# Patient Record
Sex: MALE | Race: WHITE | HISPANIC OR LATINO | Employment: OTHER | URBAN - METROPOLITAN AREA
[De-identification: names, ages, dates, MRNs, and addresses within clinical notes are randomized per-mention and may not be internally consistent; named-entity substitution may affect disease eponyms.]

---

## 2017-05-31 ENCOUNTER — HOSPITAL ENCOUNTER (EMERGENCY)
Facility: MEDICAL CENTER | Age: 32
End: 2017-05-31
Attending: EMERGENCY MEDICINE

## 2017-05-31 VITALS
OXYGEN SATURATION: 98 % | TEMPERATURE: 97.6 F | RESPIRATION RATE: 16 BRPM | HEART RATE: 74 BPM | SYSTOLIC BLOOD PRESSURE: 110 MMHG | WEIGHT: 158.51 LBS | DIASTOLIC BLOOD PRESSURE: 74 MMHG | HEIGHT: 70 IN | BODY MASS INDEX: 22.69 KG/M2

## 2017-05-31 DIAGNOSIS — L02.91 ABSCESS: ICD-10-CM

## 2017-05-31 PROCEDURE — 302875 HCHG BANDAGE ACE 4 OR 6""

## 2017-05-31 PROCEDURE — 303977 HCHG I & D

## 2017-05-31 PROCEDURE — 700101 HCHG RX REV CODE 250

## 2017-05-31 PROCEDURE — 700102 HCHG RX REV CODE 250 W/ 637 OVERRIDE(OP): Performed by: EMERGENCY MEDICINE

## 2017-05-31 PROCEDURE — A9270 NON-COVERED ITEM OR SERVICE: HCPCS | Performed by: EMERGENCY MEDICINE

## 2017-05-31 PROCEDURE — 99284 EMERGENCY DEPT VISIT MOD MDM: CPT

## 2017-05-31 RX ORDER — LIDOCAINE HYDROCHLORIDE AND EPINEPHRINE BITARTRATE 20; .01 MG/ML; MG/ML
INJECTION, SOLUTION SUBCUTANEOUS
Status: COMPLETED
Start: 2017-05-31 | End: 2017-05-31

## 2017-05-31 RX ORDER — CEPHALEXIN 500 MG/1
500 CAPSULE ORAL ONCE
Status: COMPLETED | OUTPATIENT
Start: 2017-05-31 | End: 2017-05-31

## 2017-05-31 RX ORDER — LIDOCAINE HYDROCHLORIDE AND EPINEPHRINE BITARTRATE 20; .01 MG/ML; MG/ML
10 INJECTION, SOLUTION SUBCUTANEOUS ONCE
Status: COMPLETED | OUTPATIENT
Start: 2017-05-31 | End: 2017-05-31

## 2017-05-31 RX ORDER — SULFAMETHOXAZOLE AND TRIMETHOPRIM 800; 160 MG/1; MG/1
1 TABLET ORAL 2 TIMES DAILY
Qty: 20 TAB | Refills: 0 | Status: SHIPPED | OUTPATIENT
Start: 2017-05-31 | End: 2017-06-10

## 2017-05-31 RX ORDER — SULFAMETHOXAZOLE AND TRIMETHOPRIM 800; 160 MG/1; MG/1
1 TABLET ORAL ONCE
Status: COMPLETED | OUTPATIENT
Start: 2017-05-31 | End: 2017-05-31

## 2017-05-31 RX ORDER — HYDROCODONE BITARTRATE AND ACETAMINOPHEN 5; 325 MG/1; MG/1
1-2 TABLET ORAL EVERY 4 HOURS PRN
Qty: 20 TAB | Refills: 0 | Status: SHIPPED | OUTPATIENT
Start: 2017-05-31

## 2017-05-31 RX ORDER — CEPHALEXIN 500 MG/1
500 CAPSULE ORAL 4 TIMES DAILY
Qty: 40 CAP | Refills: 0 | Status: SHIPPED | OUTPATIENT
Start: 2017-05-31 | End: 2017-06-10

## 2017-05-31 RX ADMIN — LIDOCAINE HYDROCHLORIDE AND EPINEPHRINE 10 ML: 20; 10 INJECTION, SOLUTION INFILTRATION; PERINEURAL at 10:08

## 2017-05-31 RX ADMIN — CEPHALEXIN 500 MG: 500 CAPSULE ORAL at 10:29

## 2017-05-31 RX ADMIN — SULFAMETHOXAZOLE AND TRIMETHOPRIM 1 TABLET: 800; 160 TABLET ORAL at 10:29

## 2017-05-31 RX ADMIN — LIDOCAINE HYDROCHLORIDE AND EPINEPHRINE BITARTRATE 10 ML: 20; .01 INJECTION, SOLUTION SUBCUTANEOUS at 10:08

## 2017-05-31 ASSESSMENT — PAIN SCALES - GENERAL: PAINLEVEL_OUTOF10: 8

## 2017-05-31 NOTE — ED AVS SNAPSHOT
Home Care Instructions                                                                                                                Ronnell Steele   MRN: [id number]    Department:  [facility], Emergency Dept   Date of Visit:  5/31/2017            [facility], Emergency Dept    [address] [place] [id number]    Phone:  [phone]      You were seen by     Theo Shah M.D.      Your Diagnosis Was     Abscess     L02.91       These are the medications you received during your hospitalization from 05/31/2017 0930 to 05/31/2017 1030     Date/Time Order Dose Route Action    05/31/2017 1008 lidocaine-epinephrine 2 %-1:861611 injection 10 mL 10 mL Injection Given    05/31/2017 1029 sulfamethoxazole-trimethoprim (BACTRIM DS) 800-160 MG tablet 1 Tab 1 Tab Oral Given    05/31/2017 1029 cephALEXin (KEFLEX) capsule 500 mg 500 mg Oral Given      Follow-up Information     1. Follow up with [facility], Emergency Dept.    Specialty:  Emergency Medicine    Why:  If symptoms worsen    Contact information    [address] [place] [id number] [phone]      Medication Information     Review all of your home medications and newly ordered medications with your primary doctor and/or pharmacist as soon as possible. Follow medication instructions as directed by your doctor and/or pharmacist.     Please keep your complete medication list with you and share with your physician. Update the information when medications are discontinued, doses are changed, or new medications (including over-the-counter products) are added; and carry medication information at all times in the event of emergency situations.               Medication List      START taking these medications        Instructions    Morning Afternoon Evening Bedtime    cephALEXin 500 MG Caps   Last time this was given:  500 mg on 5/31/2017 10:29 AM   Commonly known as:  KEFLEX        Take 1 Cap by  mouth 4 times a day for 10 days.   Dose:  500 mg                        hydrocodone-acetaminophen 5-325 MG Tabs per tablet   Commonly known as:  NORCO        Take 1-2 Tabs by mouth every four hours as needed.   Dose:  1-2 Tab                        sulfamethoxazole-trimethoprim 800-160 MG tablet   Last time this was given:  1 Tab on 5/31/2017 10:29 AM   Commonly known as:  BACTRIM DS        Take 1 Tab by mouth 2 times a day for 10 days.   Dose:  1 Tab                             Where to Get Your Medications      You can get these medications from any pharmacy     Bring a paper prescription for each of these medications    - cephALEXin 500 MG Caps  - hydrocodone-acetaminophen 5-325 MG Tabs per tablet  - sulfamethoxazole-trimethoprim 800-160 MG tablet              Discharge Instructions       Abscess  An abscess is an infected area that contains a collection of pus and debris. It can occur in almost any part of the body. An abscess is also known as a furuncle or boil.  CAUSES   An abscess occurs when tissue gets infected. This can occur from blockage of oil or sweat glands, infection of hair follicles, or a minor injury to the skin. As the body tries to fight the infection, pus collects in the area and creates pressure under the skin. This pressure causes pain. People with weakened immune systems have difficulty fighting infections and get certain abscesses more often.   SYMPTOMS  Usually an abscess develops on the skin and becomes a painful mass that is red, warm, and tender. If the abscess forms under the skin, you may feel a moveable soft area under the skin. Some abscesses break open (rupture) on their own, but most will continue to get worse without care. The infection can spread deeper into the body and eventually into the bloodstream, causing you to feel ill.   DIAGNOSIS   Your caregiver will take your medical history and perform a physical exam. A sample of fluid may also be taken from the abscess to  determine what is causing your infection.  TREATMENT   Your caregiver may prescribe antibiotic medicines to fight the infection. However, taking antibiotics alone usually does not cure an abscess. Your caregiver may need to make a small cut (incision) in the abscess to drain the pus. In some cases, gauze is packed into the abscess to reduce pain and to continue draining the area.  HOME CARE INSTRUCTIONS   · Only take over-the-counter or prescription medicines for pain, discomfort, or fever as directed by your caregiver.  · If you were prescribed antibiotics, take them as directed. Finish them even if you start to feel better.  · If gauze is used, follow your caregiver's directions for changing the gauze.  · To avoid spreading the infection:  ¨ Keep your draining abscess covered with a bandage.  ¨ Wash your hands well.  ¨ Do not share personal care items, towels, or whirlpools with others.  ¨ Avoid skin contact with others.  · Keep your skin and clothes clean around the abscess.  · Keep all follow-up appointments as directed by your caregiver.  SEEK MEDICAL CARE IF:   · You have increased pain, swelling, redness, fluid drainage, or bleeding.  · You have muscle aches, chills, or a general ill feeling.  · You have a fever.  MAKE SURE YOU:   · Understand these instructions.  · Will watch your condition.  · Will get help right away if you are not doing well or get worse.     This information is not intended to replace advice given to you by your health care provider. Make sure you discuss any questions you have with your health care provider.     Document Released: 09/27/2006 Document Revised: 06/18/2013 Document Reviewed: 03/01/2013  Elsevier Interactive Patient Education ©2016 [employer]            Patient Information     Patient Information    Following emergency treatment: all patient requiring follow-up care must return either to a private physician or a clinic if your condition worsens before you are able to  obtain further medical attention, please return to the emergency room.     Billing Information    At [facility], we work to make the billing process streamlined for our patients.  Our Representatives are here to answer any questions you may have regarding your hospital bill.  If you have insurance coverage and have supplied your insurance information to us, we will submit a claim to your insurer on your behalf.  Should you have any questions regarding your bill, we can be reached online or by phone as follows:  Online: You are able pay your bills online or live chat with our representatives about any billing questions you may have. We are here to help Monday - Friday from 8:00am to 7:30pm and 9:00am - 12:00pm on Saturdays.  Please visit [email or web address]  for more information.   Phone:  [phone] or [phone]    Please note that your emergency physician, surgeon, pathologist, radiologist, anesthesiologist, and other specialists are not employed by [facility] and will therefore bill separately for their services.  Please contact them directly for any questions concerning their bills at the numbers below:     Emergency Physician Services:  [phone]  [facility]:  [phone]  [facility]:  [phone]  [facility]:  [phone]    1. Your final bill may vary from the amount quoted upon discharge if all procedures are not complete at that time, or if your doctor has additional procedures of which we are not aware. You will receive an additional bill if you return to the Emergency Department at [facility] for suture removal regardless of the facility of which the sutures were placed.     2. Please arrange for settlement of this account at the emergency registration.    3. All self-pay accounts are due in full at the time of treatment.  If you are unable to meet this obligation then payment is expected within 4-5 days.      4. If you have had radiology studies (CT, X-ray, Ultrasound, MRI), you have received a preliminary result during your emergency department visit. Please contact the radiology department [phone] to receive a copy of your final result. Please discuss the Final result with your primary physician or with the follow up physician provided.     Crisis Hotline:  [place] Crisis Hotline:  8-733-QUDCQED or 1-572.263.2851  [place] Crisis Hotline:    1-275.302.8556 or 320-178-8966         ED Discharge Follow Up Questions    1. In order to provide you with very good care, we would like to follow up with a phone call in the next few days.  May we have your permission to contact you?     YES /  NO    2. What is the best phone number to call you? (       )_____-__________    3. What is the best time to call you?      Morning  /  Afternoon  /  Evening                   Patient Signature:  ____________________________________________________________    Date:  ____________________________________________________________

## 2017-05-31 NOTE — ED AVS SNAPSHOT
[employer] Access Code: [email or web address]  Expires: 6/30/2017 10:30 AM    Your email address is not on file at [facility].  Email Addresses are required for you to sign up for [employer], please contact [phone] to verify your personal information and to provide your email address prior to attempting to register for [employer].    Ronnell Abad Steele  [address] [place] [address]    [employer]  A secure, online tool to manage your health information     [facility]’s [employer]® is a secure, online tool that connects you to your personalized health information from the privacy of your home -- day or night - making it very easy for you to manage your healthcare. Once the activation process is completed, you can even access your medical information using the [employer] gaetano, which is available for free in the Apple Gaetano store or Google Play store.     To learn more about [employer], visit [email or web address]    There are two levels of access available (as shown below):   My Chart Features  [facility] Primary Care Doctor [facility]  Specialists [facility]  Urgent  Care Non-[facility] Primary Care Doctor   Email your healthcare team securely and privately 24/7 X X X    Manage appointments: schedule your next appointment; view details of past/upcoming appointments X      Request prescription refills. X      View recent personal medical records, including lab and immunizations X X X X   View health record, including health history, allergies, medications X X X X   Read reports about your outpatient visits, procedures, consult and ER notes X X X X   See your discharge summary, which is a recap of your hospital and/or ER visit that includes your diagnosis, lab results, and care plan X X  X     How to register for [employer]:  Once your e-mail address has been verified, follow the following steps to sign up for [employer].     1. Go to  [email or web address]  2. Click on the Sign Up Now box, which takes you to the New Member Sign Up page. You will  need to provide the following information:  a. Enter your [employer] Access Code exactly as it appears at the top of this page. (You will not need to use this code after you’ve completed the sign-up process. If you do not sign up before the expiration date, you must request a new code.)   b. Enter your date of birth.   c. Enter your home email address.   d. Click Submit, and follow the next screen’s instructions.  3. Create a [employer] ID. This will be your [employer] login ID and cannot be changed, so think of one that is secure and easy to remember.  4. Create a [employer] password. You can change your password at any time.  5. Enter your Password Reset Question and Answer. This can be used at a later time if you forget your password.   6. Enter your e-mail address. This allows you to receive e-mail notifications when new information is available in [employer].  7. Click Sign Up. You can now view your health information.    For assistance activating your [employer] account, call [phone]

## 2017-05-31 NOTE — ED NOTES
Pt presents to ED with CC of left knee pain x 1 week. Pt had a staph infection and surgery 10 years ago, on same knee. Knee with obvious edema at this time. No recent trauma

## 2017-05-31 NOTE — ED AVS SNAPSHOT
5/31/2017    Ronnell Steele  [address] [facility] [address]    Dear Ronnell:    [facility] wants to ensure your discharge home is safe and you or your loved ones have had all of your questions answered regarding your care after you leave the hospital.    Below is a list of resources and contact information should you have any questions regarding your hospital stay, follow-up instructions, or active medical symptoms.    Questions or Concerns Regarding… Contact   Medical Questions Related to Your Discharge  (7 days a week, 8am-5pm) Contact a Nurse Care Coordinator   [phone]   Medical Questions Not Related to Your Discharge  (24 hours a day / 7 days a week)  Contact the Nurse Health Line   [phone]    Medications or Discharge Instructions Refer to your discharge packet   or contact your [facility] Primary Care Provider   [phone]   Follow-up Appointment(s) Schedule your appointment via [employer]   or contact Scheduling [phone]   Billing Review your statement via [employer]  or contact Billing [phone]   Medical Records Review your records via [employer]   or contact Medical Records [phone]     You may receive a telephone call within two days of discharge. This call is to make certain you understand your discharge instructions and have the opportunity to have any questions answered. You can also easily access your medical information, test results and upcoming appointments via the [employer] free online health management tool. You can learn more and sign up at [email or web address]. For assistance setting up your [employer] account, please call [phone].    Once again, we want to ensure your discharge home is safe and that you have a clear understanding of any next steps in your care. If you have any questions or concerns, please do not hesitate to contact us, we are here for you. Thank you for choosing [facility] for your healthcare needs.    Sincerely,    Your [facility] Healthcare Team

## 2017-05-31 NOTE — ED PROVIDER NOTES
"ED Provider Note    Scribed for Theo Shah M.D. by Sosa Osorio. 5/31/2017  10:02 AM    Primary care provider: No primary care provider on file.  Means of arrival: Walk-In  History obtained from: Patient  History limited by: None    CHIEF COMPLAINT  Chief Complaint   Patient presents with   • Knee Pain       HPI  Ronnell Steele is a 31 y.o. male who presents to the Emergency Department for left knee pain onset 1 week. Patient confirms a past staph infection and surgery 10 years ago on the same knee. Denies much pain around the site. Patient reports his knee has always been swollen since his past surgery.     REVIEW OF SYSTEMS  Pertinent positives include knee pain.   Pertinent negatives include no drainage, recent injuries or falls.      PAST MEDICAL HISTORY    No past medical history on file.    SURGICAL HISTORY  patient denies any surgical history    SOCIAL HISTORY  None noted.    FAMILY HISTORY  No family history on file.    CURRENT MEDICATIONS  Home Medications     **Home medications have not yet been reviewed for this encounter**          ALLERGIES  No Known Allergies    PHYSICAL EXAM  VITAL SIGNS: /82 mmHg  Pulse 92  Temp(Src) 36.4 °C (97.6 °F) (Temporal)  Resp 18  Ht 1.778 m (5' 10\")  Wt 71.9 kg (158 lb 8.2 oz)  BMI 22.74 kg/m2  SpO2 97%    Nursing note and vitals reviewed.  Constitutional: No distress.   HENT: Head is atraumatic. Oropharynx is moist.   Eyes: Conjunctivae are normal. Pupils are equal, round, and reactive to light.   Cardiovascular: Normal peripheral perfusion  Respiratory: No respiratory distress.   Musculoskeletal: Normal range of motion.   Neurological: Alert. No focal deficits noted.    Skin: 4 by 4 cm abscess over the anterior aspect of the knee with a central pustule and fluctuance. The knee joint itself is not erythematous and the patient does not have pain with passive range of motion. Skin is warm and dry. No rash.   Psychiatric: Appropriate for clinical " situation    DIAGNOSTIC STUDIES / PROCEDURES    Incision and Drainage Procedure Note    Indication: Abscess    Procedure: The patient was positioned appropriately and the skin over the incision site was prepped with alcohol. Local anesthesia was obtained by infiltration using 2% Lidocaine with epinephrine.  An incision was then made over the greatest area of fluctuance and approximately 3 cc of purulent material was expressed. Loculations were not present. The drainage cavity was then packed with sterile gauze. The patient’s tetanus status was updated with a tetanus booster.    The patient tolerated the procedure well.    Complications: None      COURSE & MEDICAL DECISION MAKING  Nursing notes, VS, PMSFHx reviewed in chart.     10:02 AM - Patient seen and examined at bedside.I feel that this is a superficial abscess and I do not feel he has septic arthritis. Patient will be treated with lidocaine-epinephrine 10 mL.     10:11 AM - Performed I&D procedure at bedside. He was instructed to return to the ED in 2 days to assess the wound. He will be discharged home with crutches, an ace wrap and antibiotics at this time.    I reviewed prescription monitoring program for the patient's narcotic use before prescribing a scheduled drug. The patient will not drink alcohol nor drive with prescribed medications. The patient will return for new or worsening symptoms and is stable at the time of discharge.    The patient is referred to a primary physician for blood pressure management, diabetic screening, and for all other preventative health concerns.    The patient was discharged home with an information sheet on abscess and told to return immediately for any signs or symptoms listed.  The patient agreed to the discharge precautions and follow-up plan which is documented in EPIC.      DISPOSITION:  Patient will be discharged home in stable condition.    FOLLOW UP:  [facility], Emergency Dept  [address]  [facility] [id number] [phone]    If symptoms worsen      OUTPATIENT MEDICATIONS:  Discharge Medication List as of 5/31/2017 10:30 AM      START taking these medications    Details   sulfamethoxazole-trimethoprim (BACTRIM DS) 800-160 MG tablet Take 1 Tab by mouth 2 times a day for 10 days., Disp-20 Tab, R-0, Print Rx Paper      cephALEXin (KEFLEX) 500 MG Cap Take 1 Cap by mouth 4 times a day for 10 days., Disp-40 Cap, R-0, Print Rx Paper      hydrocodone-acetaminophen (NORCO) 5-325 MG Tab per tablet Take 1-2 Tabs by mouth every four hours as needed., Disp-20 Tab, R-0, Print Rx Paper                 FINAL IMPRESSION  Performed I&D procedure, see above.  1. Abscess          Sosa CHAPPELL (Kikiibe), am scribing for, and in the presence of, Theo Shah M.D..    Electronically signed by: Sosa Osorio (Casi), 5/31/2017    Theo CHAPPELL M.D. personally performed the services described in this documentation, as scribed by Sosa Osorio in my presence, and it is both accurate and complete.    The note accurately reflects work and decisions made by me.  Theo Shah  5/31/2017  12:08 PM

## 2017-06-02 ENCOUNTER — HOSPITAL ENCOUNTER (EMERGENCY)
Facility: MEDICAL CENTER | Age: 32
End: 2017-06-02
Attending: EMERGENCY MEDICINE

## 2017-06-02 VITALS
DIASTOLIC BLOOD PRESSURE: 76 MMHG | HEIGHT: 70 IN | HEART RATE: 89 BPM | SYSTOLIC BLOOD PRESSURE: 110 MMHG | TEMPERATURE: 98.1 F | BODY MASS INDEX: 23.39 KG/M2 | RESPIRATION RATE: 16 BRPM | WEIGHT: 163.36 LBS | OXYGEN SATURATION: 99 %

## 2017-06-02 DIAGNOSIS — Z51.89 ENCOUNTER FOR WOUND RE-CHECK: ICD-10-CM

## 2017-06-02 PROCEDURE — 99283 EMERGENCY DEPT VISIT LOW MDM: CPT

## 2017-06-02 PROCEDURE — 304217 HCHG IRRIGATION SYSTEM

## 2017-06-02 ASSESSMENT — PAIN SCALES - GENERAL: PAINLEVEL_OUTOF10: 7

## 2017-06-02 NOTE — ED AVS SNAPSHOT
[employer] Access Code: [email or web address]  Expires: 6/30/2017 10:30 AM    Your email address is not on file at [facility].  Email Addresses are required for you to sign up for [employer], please contact [phone] to verify your personal information and to provide your email address prior to attempting to register for [employer].    Ronnell Abad Steele  [address] [place] [address]    [employer]  A secure, online tool to manage your health information     [employer]’s [employer]® is a secure, online tool that connects you to your personalized health information from the privacy of your home -- day or night - making it very easy for you to manage your healthcare. Once the activation process is completed, you can even access your medical information using the [employer] gaetano, which is available for free in the Apple [employer] store or Google Play store.     To learn more about [employer], visit [email or web address]/[employer]    There are two levels of access available (as shown below):   My Chart Features  [facility] Primary Care Doctor [facility]  Specialists [facility]  Urgent  Care Non-[facility] Primary Care Doctor   Email your healthcare team securely and privately 24/7 X X X    Manage appointments: schedule your next appointment; view details of past/upcoming appointments X      Request prescription refills. X      View recent personal medical records, including lab and immunizations X X X X   View health record, including health history, allergies, medications X X X X   Read reports about your outpatient visits, procedures, consult and ER notes X X X X   See your discharge summary, which is a recap of your hospital and/or ER visit that includes your diagnosis, lab results, and care plan X X  X     How to register for [employer]:  Once your e-mail address has been verified, follow the following steps to sign up for [employer].     1. Go to  [email or web address]  2. Click on the Sign Up Now box, which takes you to the New Member Sign Up page. You will  need to provide the following information:  a. Enter your [employer] Access Code exactly as it appears at the top of this page. (You will not need to use this code after you’ve completed the sign-up process. If you do not sign up before the expiration date, you must request a new code.)   b. Enter your date of birth.   c. Enter your home email address.   d. Click Submit, and follow the next screen’s instructions.  3. Create a [employer] ID. This will be your [employer] login ID and cannot be changed, so think of one that is secure and easy to remember.  4. Create a [employer] password. You can change your password at any time.  5. Enter your Password Reset Question and Answer. This can be used at a later time if you forget your password.   6. Enter your e-mail address. This allows you to receive e-mail notifications when new information is available in [employer].  7. Click Sign Up. You can now view your health information.    For assistance activating your [employer] account, call [phone]

## 2017-06-02 NOTE — ED PROVIDER NOTES
"ED Provider Note      CHIEF COMPLAINT   Chief Complaint   Patient presents with   • Wound Re-Check       HPI   Ronnell Steele is a 31 y.o. male who presents to the emergency pertinent for abscess recheck. He had I&D of abscess on 5/31 and he is placed on Keflex and Bactrim. He reports he is overall had improvement. Less pain at the area. No fever. He has not changed the dressing since it was placed 2 days ago.    REVIEW OF SYSTEMS   Pertinent negative: As above    PAST MEDICAL HISTORY   Septic arthritis left knee    SOCIAL HISTORY  Denies drugs or alcohol    ALLERGIES   See chart    PHYSICAL EXAM  VITAL SIGNS: /62 mmHg  Pulse 95  Temp(Src) 36.7 °C (98.1 °F)  Resp 16  Ht 1.778 m (5' 10\")  Wt 74.1 kg (163 lb 5.8 oz)  BMI 23.44 kg/m2  SpO2 99%  Head: Atraumatic  Eyes: Eyes normal inspection  Neck: has full range of motion, normal inspection.  Constitutional: No acute distress   Cardiovascular: Normal heart rate. Pulses strong tibial  Thorax & Lungs: No respiratory distress  Skin: Incision anterior left knee within a previous surgical wound. No surrounding redness. There is persistent drainage. The area is irrigated.  Musculoskeletal: There is mild swelling of the left knee. He has full range of motion of the knee without discomfort. No significant joint effusion. No redness or warmth of the knee itself. There is no tenderness in the popliteal fossa. No Homans or cords.  Neurologic:  Normal sensory and motor      COURSE & MEDICAL DECISION MAKING    Patient presents for abscess recheck. He has a subcutaneous abscess that has been incised over the left anterior knee. The packing was removed. There is significant persistent drainage. The cavity was irrigated. With the location of the wound as well as the patient's history certainly there is consideration given to the possibility of intra-articular extension however I do not see any evidence of this at this time. We will continue his oral antibiotics. He is " given instructions regarding wound care. Advised to soak and keep moist. Allow continued drainage. He is referred to the [facility] for primary medical care. If there is any concern whether he is improving or if he has pain within the knee or fevers he needs to return to the ER immediately. He voiced understanding and patient discharged.    FINAL IMPRESSION  1. Abscess recheck      This dictation was created using voice recognition software. The accuracy of the dictation is limited to the abilities of the software. I expect there may be some errors of grammar and possibly content. The nursing notes were reviewed and certain aspects of this information were incorporated into this note.      Electronically signed by: Yves Hays, 6/2/2017 7:47 AM

## 2017-06-02 NOTE — DISCHARGE INSTRUCTIONS
Abscess  Care After  An abscess (also called a boil or furuncle) is an infected area that contains a collection of pus. Signs and symptoms of an abscess include pain, tenderness, redness, or hardness, or you may feel a moveable soft area under your skin. An abscess can occur anywhere in the body. The infection may spread to surrounding tissues causing cellulitis. A cut (incision) by the surgeon was made over your abscess and the pus was drained out. Gauze may have been packed into the space to provide a drain that will allow the cavity to heal from the inside outwards. The boil may be painful for 5 to 7 days. Most people with a boil do not have high fevers. Your abscess, if seen early, may not have localized, and may not have been lanced. If not, another appointment may be required for this if it does not get better on its own or with medications.  HOME CARE INSTRUCTIONS   · Only take over-the-counter or prescription medicines for pain, discomfort, or fever as directed by your caregiver.  · When you bathe, soak and then remove gauze or iodoform packs at least daily or as directed by your caregiver. You may then wash the wound gently with mild soapy water. Repack with gauze or do as your caregiver directs.  SEEK IMMEDIATE MEDICAL CARE IF:   · You develop increased pain, swelling, redness, drainage, or bleeding in the wound site.  · You develop signs of generalized infection including muscle aches, chills, fever, or a general ill feeling.  · An oral temperature above 102° F (38.9° C) develops, not controlled by medication.  See your caregiver for a recheck if you develop any of the symptoms described above. If medications (antibiotics) were prescribed, take them as directed.  Document Released: 07/06/2006 Document Revised: 03/11/2013 Document Reviewed: 03/02/2009  [employer]® Patient Information ©2014 [employer].

## 2017-06-02 NOTE — ED AVS SNAPSHOT
6/2/2017    Ronnell Steele  [address] [address] [address]    Dear Ronnell:    [facility] wants to ensure your discharge home is safe and you or your loved ones have had all of your questions answered regarding your care after you leave the hospital.    Below is a list of resources and contact information should you have any questions regarding your hospital stay, follow-up instructions, or active medical symptoms.    Questions or Concerns Regarding… Contact   Medical Questions Related to Your Discharge  (7 days a week, 8am-5pm) Contact a Nurse Care Coordinator   [phone]   Medical Questions Not Related to Your Discharge  (24 hours a day / 7 days a week)  Contact the Nurse Health Line   [phone]    Medications or Discharge Instructions Refer to your discharge packet   or contact your [facility] Primary Care Provider   [phone]   Follow-up Appointment(s) Schedule your appointment via DIY   or contact Scheduling [phone]   Billing Review your statement via DIY  or contact Billing [phone]   Medical Records Review your records via DIY   or contact Medical Records [phone]     You may receive a telephone call within two days of discharge. This call is to make certain you understand your discharge instructions and have the opportunity to have any questions answered. You can also easily access your medical information, test results and upcoming appointments via the DIY free online health management tool. You can learn more and sign up at [email or web address][email or web address]. For assistance setting up your DIY account, please call [phone].    Once again, we want to ensure your discharge home is safe and that you have a clear understanding of any next steps in your care. If you have any questions or concerns, please do not hesitate to contact us, we are here for you. Thank you for choosing [facility] for your healthcare needs.    Sincerely,    Your [facility] Healthcare Team

## 2017-06-02 NOTE — ED NOTES
Ronnell Steele  31 y.o.  male  Chief Complaint   Patient presents with   • Wound Re-Check     Present to triage for wound re-check. Had an I & D 2 days ago ( left knee ) and was told to come back after 2 days.

## 2017-06-02 NOTE — ED AVS SNAPSHOT
Home Care Instructions                                                                                                                Ronnell Steele   MRN: [id number]    Department:  [facility], Emergency Dept   Date of Visit:  6/2/2017            [facility], Emergency Dept    [address] [place] [address]    Phone:  [phone]      You were seen by     Yves Hays M.D.      Your Diagnosis Was     Encounter for wound re-check     Z51.89       Follow-up Information     1. Follow up with [facility].    Contact information    [address]  [address]  [facility] [address] [phone]          2. Follow up with [facility], Emergency Dept In 3 days.    Specialty:  Emergency Medicine    Why:  As needed    Contact information    [address] [address] [phone]      Medication Information     Review all of your home medications and newly ordered medications with your primary doctor and/or pharmacist as soon as possible. Follow medication instructions as directed by your doctor and/or pharmacist.     Please keep your complete medication list with you and share with your physician. Update the information when medications are discontinued, doses are changed, or new medications (including over-the-counter products) are added; and carry medication information at all times in the event of emergency situations.               Medication List      ASK your doctor about these medications        Instructions    Morning Afternoon Evening Bedtime    cephALEXin 500 MG Caps   Commonly known as:  KEFLEX        Take 1 Cap by mouth 4 times a day for 10 days.   Dose:  500 mg                        hydrocodone-acetaminophen 5-325 MG Tabs per tablet   Commonly known as:  NORCO        Take 1-2 Tabs by mouth every four hours as needed.   Dose:  1-2 Tab                        sulfamethoxazole-trimethoprim 800-160 MG tablet   Commonly known as:  BACTRIM DS          Take 1 Tab by mouth 2 times a day for 10 days.   Dose:  1 Tab                                  Discharge Instructions       Abscess  Care After  An abscess (also called a boil or furuncle) is an infected area that contains a collection of pus. Signs and symptoms of an abscess include pain, tenderness, redness, or hardness, or you may feel a moveable soft area under your skin. An abscess can occur anywhere in the body. The infection may spread to surrounding tissues causing cellulitis. A cut (incision) by the surgeon was made over your abscess and the pus was drained out. Gauze may have been packed into the space to provide a drain that will allow the cavity to heal from the inside outwards. The boil may be painful for 5 to 7 days. Most people with a boil do not have high fevers. Your abscess, if seen early, may not have localized, and may not have been lanced. If not, another appointment may be required for this if it does not get better on its own or with medications.  HOME CARE INSTRUCTIONS   · Only take over-the-counter or prescription medicines for pain, discomfort, or fever as directed by your caregiver.  · When you bathe, soak and then remove gauze or iodoform packs at least daily or as directed by your caregiver. You may then wash the wound gently with mild soapy water. Repack with gauze or do as your caregiver directs.  SEEK IMMEDIATE MEDICAL CARE IF:   · You develop increased pain, swelling, redness, drainage, or bleeding in the wound site.  · You develop signs of generalized infection including muscle aches, chills, fever, or a general ill feeling.  · An oral temperature above 102° F (38.9° C) develops, not controlled by medication.  See your caregiver for a recheck if you develop any of the symptoms described above. If medications (antibiotics) were prescribed, take them as directed.  Document Released: 07/06/2006 Document Revised: 03/11/2013 Document Reviewed: 03/02/2009  [employer]® Patient  Information ©2014 [facility].            Patient Information     Patient Information    Following emergency treatment: all patient requiring follow-up care must return either to a private physician or a clinic if your condition worsens before you are able to obtain further medical attention, please return to the emergency room.     Billing Information    At [facility], we work to make the billing process streamlined for our patients.  Our Representatives are here to answer any questions you may have regarding your hospital bill.  If you have insurance coverage and have supplied your insurance information to us, we will submit a claim to your insurer on your behalf.  Should you have any questions regarding your bill, we can be reached online or by phone as follows:  Online: You are able pay your bills online or live chat with our representatives about any billing questions you may have. We are here to help Monday - Friday from 8:00am to 7:30pm and 9:00am - 12:00pm on Saturdays.  Please visit [email or web address]  for more information.   Phone:  [phone] or [phone]    Please note that your emergency physician, surgeon, pathologist, radiologist, anesthesiologist, and other specialists are not employed by [facility] and will therefore bill separately for their services.  Please contact them directly for any questions concerning their bills at the numbers below:     Emergency Physician Services:  [phone]  [facility]:  [phone]  [facility]:  [phone]  [facility]:  [phone]    1. Your final bill may vary from the amount quoted upon discharge if all procedures are not complete at that time, or if your doctor has additional procedures of which we are not aware. You will receive an additional bill if you return to the Emergency Department at [facility] for suture removal regardless of the facility of which the  sutures were placed.     2. Please arrange for settlement of this account at the emergency registration.    3. All self-pay accounts are due in full at the time of treatment.  If you are unable to meet this obligation then payment is expected within 4-5 days.     4. If you have had radiology studies (CT, X-ray, Ultrasound, MRI), you have received a preliminary result during your emergency department visit. Please contact the radiology department [phone] to receive a copy of your final result. Please discuss the Final result with your primary physician or with the follow up physician provided.     Crisis Hotline:  [place] Crisis Hotline:  7-439-BHHGQNW or 1-252.595.3354  [place] Crisis Hotline:    1-787.172.4795 or 882-212-8250         ED Discharge Follow Up Questions    1. In order to provide you with very good care, we would like to follow up with a phone call in the next few days.  May we have your permission to contact you?     YES /  NO    2. What is the best phone number to call you? (       )_____-__________    3. What is the best time to call you?      Morning  /  Afternoon  /  Evening                   Patient Signature:  ____________________________________________________________    Date:  ____________________________________________________________
